# Patient Record
Sex: MALE | Race: WHITE | NOT HISPANIC OR LATINO | Employment: UNEMPLOYED | URBAN - NONMETROPOLITAN AREA
[De-identification: names, ages, dates, MRNs, and addresses within clinical notes are randomized per-mention and may not be internally consistent; named-entity substitution may affect disease eponyms.]

---
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
## 2022-03-29 ENCOUNTER — APPOINTMENT (OUTPATIENT)
Dept: ULTRASOUND IMAGING | Age: 22
End: 2022-03-29
Payer: MEDICAID

## 2022-03-29 ENCOUNTER — HOSPITAL ENCOUNTER (EMERGENCY)
Age: 22
Discharge: HOME | End: 2022-03-29
Attending: EMERGENCY MEDICINE
Payer: MEDICAID

## 2022-03-29 ENCOUNTER — APPOINTMENT (OUTPATIENT)
Dept: CT IMAGING | Age: 22
End: 2022-03-29
Payer: MEDICAID

## 2022-03-29 VITALS
RESPIRATION RATE: 20 BRPM | WEIGHT: 155 LBS | BODY MASS INDEX: 22.19 KG/M2 | DIASTOLIC BLOOD PRESSURE: 87 MMHG | SYSTOLIC BLOOD PRESSURE: 150 MMHG | OXYGEN SATURATION: 99 % | TEMPERATURE: 98.5 F | HEART RATE: 97 BPM | HEIGHT: 70 IN

## 2022-03-29 DIAGNOSIS — R10.32 LEFT INGUINAL PAIN: Primary | ICD-10-CM

## 2022-03-29 DIAGNOSIS — S30.22XA SCROTAL HEMATOMA: ICD-10-CM

## 2022-03-29 DIAGNOSIS — D73.4 SPLENIC CYST: ICD-10-CM

## 2022-03-29 LAB
A/G RATIO: 1.9 (ref 1.1–2.2)
ALBUMIN SERPL-MCNC: 4.5 G/DL (ref 3.4–5)
ALP BLD-CCNC: 118 U/L (ref 40–129)
ALT SERPL-CCNC: 49 U/L (ref 10–40)
ANION GAP SERPL CALCULATED.3IONS-SCNC: 9 MMOL/L (ref 3–16)
AST SERPL-CCNC: 38 U/L (ref 15–37)
BASOPHILS ABSOLUTE: 0 K/UL (ref 0–0.2)
BASOPHILS RELATIVE PERCENT: 0.6 %
BILIRUB SERPL-MCNC: 0.4 MG/DL (ref 0–1)
BUN BLDV-MCNC: 15 MG/DL (ref 7–20)
CALCIUM SERPL-MCNC: 8.9 MG/DL (ref 8.3–10.6)
CHLORIDE BLD-SCNC: 101 MMOL/L (ref 99–110)
CO2: 26 MMOL/L (ref 21–32)
CREAT SERPL-MCNC: 1 MG/DL (ref 0.9–1.3)
EOSINOPHILS ABSOLUTE: 0.3 K/UL (ref 0–0.6)
EOSINOPHILS RELATIVE PERCENT: 3.3 %
GFR AFRICAN AMERICAN: >60
GFR NON-AFRICAN AMERICAN: >60
GLUCOSE BLD-MCNC: 92 MG/DL (ref 70–99)
HCT VFR BLD CALC: 41.9 % (ref 40.5–52.5)
HEMOGLOBIN: 14.4 G/DL (ref 13.5–17.5)
LIPASE: 29 U/L (ref 13–60)
LYMPHOCYTES ABSOLUTE: 2.5 K/UL (ref 1–5.1)
LYMPHOCYTES RELATIVE PERCENT: 28 %
MCH RBC QN AUTO: 29.9 PG (ref 26–34)
MCHC RBC AUTO-ENTMCNC: 34.5 G/DL (ref 31–36)
MCV RBC AUTO: 86.6 FL (ref 80–100)
MONOCYTES ABSOLUTE: 1 K/UL (ref 0–1.3)
MONOCYTES RELATIVE PERCENT: 10.8 %
NEUTROPHILS ABSOLUTE: 5.1 K/UL (ref 1.7–7.7)
NEUTROPHILS RELATIVE PERCENT: 57.3 %
PDW BLD-RTO: 13.4 % (ref 12.4–15.4)
PLATELET # BLD: 202 K/UL (ref 135–450)
PMV BLD AUTO: 8.5 FL (ref 5–10.5)
POTASSIUM REFLEX MAGNESIUM: 4 MMOL/L (ref 3.5–5.1)
RBC # BLD: 4.84 M/UL (ref 4.2–5.9)
SODIUM BLD-SCNC: 136 MMOL/L (ref 136–145)
TOTAL PROTEIN: 6.9 G/DL (ref 6.4–8.2)
WBC # BLD: 8.9 K/UL (ref 4–11)

## 2022-03-29 PROCEDURE — 76870 US EXAM SCROTUM: CPT

## 2022-03-29 PROCEDURE — 99283 EMERGENCY DEPT VISIT LOW MDM: CPT

## 2022-03-29 PROCEDURE — 6360000004 HC RX CONTRAST MEDICATION: Performed by: EMERGENCY MEDICINE

## 2022-03-29 PROCEDURE — 74177 CT ABD & PELVIS W/CONTRAST: CPT

## 2022-03-29 PROCEDURE — 85025 COMPLETE CBC W/AUTO DIFF WBC: CPT

## 2022-03-29 PROCEDURE — 80053 COMPREHEN METABOLIC PANEL: CPT

## 2022-03-29 PROCEDURE — 83690 ASSAY OF LIPASE: CPT

## 2022-03-29 RX ADMIN — IOPAMIDOL 75 ML: 755 INJECTION, SOLUTION INTRAVENOUS at 04:34

## 2022-03-29 ASSESSMENT — PAIN - FUNCTIONAL ASSESSMENT: PAIN_FUNCTIONAL_ASSESSMENT: 0-10

## 2022-03-29 ASSESSMENT — PAIN DESCRIPTION - PAIN TYPE: TYPE: ACUTE PAIN

## 2022-03-29 ASSESSMENT — PAIN SCALES - GENERAL: PAINLEVEL_OUTOF10: 8

## 2022-03-29 ASSESSMENT — PAIN DESCRIPTION - ORIENTATION: ORIENTATION: LEFT

## 2022-03-29 NOTE — ED PROVIDER NOTES
CHIEF COMPLAINT  Other (lump on left testicle; noticed 3 days ago; tripled in size after intercourse within the last hour per patient)      HISTORY OF PRESENT ILLNESS  Johanna Plaza is a 19 y.o. male with a history of polysubstance use presenting for evaluation of left-sided groin and testicular pain. Patient states that he noted a lump on the left testicle several days ago. He states that he had intercourse tonight and this lump had tripled in size. He denies any pain, dysuria with urination. He states that the pain radiates up into the left lower abdomen. No fevers, nausea or vomiting. History reviewed. No pertinent past medical history. History reviewed. No pertinent surgical history. History reviewed. No pertinent family history. Social History     Socioeconomic History    Marital status: Single     Spouse name: Not on file    Number of children: Not on file    Years of education: Not on file    Highest education level: Not on file   Occupational History    Not on file   Tobacco Use    Smoking status: Current Every Day Smoker     Packs/day: 1.00     Years: 7.00     Pack years: 7.00     Types: Cigarettes    Smokeless tobacco: Never Used   Vaping Use    Vaping Use: Never used   Substance and Sexual Activity    Alcohol use: Not Currently    Drug use: Yes     Frequency: 1.0 times per week     Types: Marijuana (Weed), Methamphetamines (Crystal Meth), IV    Sexual activity: Yes     Partners: Female   Other Topics Concern    Not on file   Social History Narrative    Not on file     Social Determinants of Health     Financial Resource Strain:     Difficulty of Paying Living Expenses: Not on file   Food Insecurity:     Worried About Running Out of Food in the Last Year: Not on file    Baljeet of Food in the Last Year: Not on file   Transportation Needs:     Lack of Transportation (Medical): Not on file    Lack of Transportation (Non-Medical):  Not on file   Physical Activity:     Days of Exercise per Week: Not on file    Minutes of Exercise per Session: Not on file   Stress:     Feeling of Stress : Not on file   Social Connections:     Frequency of Communication with Friends and Family: Not on file    Frequency of Social Gatherings with Friends and Family: Not on file    Attends Christianity Services: Not on file    Active Member of [address] [place] [address] [employer] or Organizations: Not on file    Attends Club or Organization Meetings: Not on file    Marital Status: Not on file   Intimate Partner Violence:     Fear of Current or Ex-Partner: Not on file    Emotionally Abused: Not on file    Physically Abused: Not on file    Sexually Abused: Not on file   Housing Stability:     Unable to Pay for Housing in the Last Year: Not on file    Number of [place] in the Last Year: Not on file    Unstable Housing in the Last Year: Not on file     No current facility-administered medications for this encounter. No current outpatient medications on file. No Known Allergies    REVIEW OF SYSTEMS  Positive and pertinent negatives as per HPI. All other systems were reviewed and are negative. PHYSICAL EXAM  BP (!) 150/87   Pulse 97   Temp 98.5 °F (36.9 °C) (Oral)   Resp 20   Ht 5' 10\" (1.778 m)   Wt 155 lb (70.3 kg)   SpO2 99%   BMI 22.24 kg/m²   GENERAL APPEARANCE: Awake and alert. Cooperative. HEAD: Normocephalic. Atraumatic. HEART: RRR. No harsh murmurs. Intact radial pulses 2+ bilaterally. LUNGS: Respirations unlabored without accessory muscle use. Speaking comfortably in full sentences. ABDOMEN: Soft. Non-distended. Mild tenderness to left lower quadrant. No guarding or rebound.   Genitourinary exam reveals circumcised penis, no rash, bilateral descended testicles, no significant testicular swelling, mild tenderness to palpation of the left testicle, intact cremasteric reflex bilaterally, there is some mild swelling, pain to palpation at the left lower inguinal region superior to the left testicle, no overlying skin changes  EXTREMITIES: No peripheral edema. No acute deformities. SKIN: Warm and dry. No acute rashes. NEUROLOGICAL: Alert and oriented X 3. No focal deficits    LABS  I have reviewed all labs for this visit. Results for orders placed or performed during the hospital encounter of 03/29/22   CBC with Auto Differential   Result Value Ref Range    WBC 8.9 4.0 - 11.0 K/uL    RBC 4.84 4.20 - 5.90 M/uL    Hemoglobin 14.4 13.5 - 17.5 g/dL    Hematocrit 41.9 40.5 - 52.5 %    MCV 86.6 80.0 - 100.0 fL    MCH 29.9 26.0 - 34.0 pg    MCHC 34.5 31.0 - 36.0 g/dL    RDW 13.4 12.4 - 15.4 %    Platelets 737 159 - 675 K/uL    MPV 8.5 5.0 - 10.5 fL    Neutrophils % 57.3 %    Lymphocytes % 28.0 %    Monocytes % 10.8 %    Eosinophils % 3.3 %    Basophils % 0.6 %    Neutrophils Absolute 5.1 1.7 - 7.7 K/uL    Lymphocytes Absolute 2.5 1.0 - 5.1 K/uL    Monocytes Absolute 1.0 0.0 - 1.3 K/uL    Eosinophils Absolute 0.3 0.0 - 0.6 K/uL    Basophils Absolute 0.0 0.0 - 0.2 K/uL   Comprehensive Metabolic Panel w/ Reflex to MG   Result Value Ref Range    Sodium 136 136 - 145 mmol/L    Potassium reflex Magnesium 4.0 3.5 - 5.1 mmol/L    Chloride 101 99 - 110 mmol/L    CO2 26 21 - 32 mmol/L    Anion Gap 9 3 - 16    Glucose 92 70 - 99 mg/dL    BUN 15 7 - 20 mg/dL    CREATININE 1.0 0.9 - 1.3 mg/dL    GFR Non-African American >60 >60    GFR African American >60 >60    Calcium 8.9 8.3 - 10.6 mg/dL    Total Protein 6.9 6.4 - 8.2 g/dL    Albumin 4.5 3.4 - 5.0 g/dL    Albumin/Globulin Ratio 1.9 1.1 - 2.2    Total Bilirubin 0.4 0.0 - 1.0 mg/dL    Alkaline Phosphatase 118 40 - 129 U/L    ALT 49 (H) 10 - 40 U/L    AST 38 (H) 15 - 37 U/L   Lipase   Result Value Ref Range    Lipase 29.0 13.0 - 60.0 U/L     RADIOLOGY  X-RAYS:  I have reviewed radiologic plain film image(s). ALL OTHER NON-PLAIN FILM IMAGES SUCH AS CT, ULTRASOUND AND MRI HAVE BEEN READ BY THE RADIOLOGIST.   CT ABDOMEN PELVIS W IV CONTRAST Additional Contrast? None cyst.  With these findings will require repeat ultrasound and CAT scan in 6 to 12 months for follow-up. Patient was advised of these findings. He was advised to follow-up with PCP or return to emerge department for worsening symptoms. The patient will be discharged from the emergency department. The patient was counseled on their diagnosis and any medications prescribed. They were advised on the need for PCP followup. They were counseled on the need to return to the emergency department if any of their symptoms were to worsen, change or have any other concerns. Discharged in stable condition. CLINICAL IMPRESSION  1. Left inguinal pain    2. Splenic cyst    3. Scrotal hematoma        Blood pressure (!) 150/87, pulse 97, temperature 98.5 °F (36.9 °C), temperature source Oral, resp. rate 20, height 5' 10\" (1.778 m), weight 155 lb (70.3 kg), SpO2 99 %. DISPOSITION  Ave Pulse was discharged to home in stable condition. This chart was generated in part by using Dragon Dictation system and may contain errors related to that system including errors in grammar, punctuation, and spelling, as well as words and phrases that may be inappropriate. If there are any questions or concerns please feel free to contact the dictating provider for clarification.      Pat Chen MD  03/29/22 8307

## 2022-08-26 ENCOUNTER — APPOINTMENT (OUTPATIENT)
Dept: GENERAL RADIOLOGY | Age: 22
End: 2022-08-26
Payer: MEDICAID

## 2022-08-26 ENCOUNTER — HOSPITAL ENCOUNTER (EMERGENCY)
Age: 22
Discharge: HOME | End: 2022-08-27
Attending: EMERGENCY MEDICINE
Payer: MEDICAID

## 2022-08-26 ENCOUNTER — APPOINTMENT (OUTPATIENT)
Dept: CT IMAGING | Age: 22
End: 2022-08-26
Payer: MEDICAID

## 2022-08-26 DIAGNOSIS — F10.920 ACUTE ALCOHOLIC INTOXICATION WITHOUT COMPLICATION (HCC): Primary | ICD-10-CM

## 2022-08-26 PROCEDURE — 96374 THER/PROPH/DIAG INJ IV PUSH: CPT

## 2022-08-26 PROCEDURE — 70450 CT HEAD/BRAIN W/O DYE: CPT

## 2022-08-26 PROCEDURE — 96361 HYDRATE IV INFUSION ADD-ON: CPT

## 2022-08-26 PROCEDURE — 99284 EMERGENCY DEPT VISIT MOD MDM: CPT

## 2022-08-26 RX ORDER — ONDANSETRON 2 MG/ML
4 INJECTION INTRAMUSCULAR; INTRAVENOUS ONCE
Status: COMPLETED | OUTPATIENT
Start: 2022-08-26 | End: 2022-08-27

## 2022-08-26 ASSESSMENT — PAIN - FUNCTIONAL ASSESSMENT: PAIN_FUNCTIONAL_ASSESSMENT: NONE - DENIES PAIN

## 2022-08-27 ENCOUNTER — APPOINTMENT (OUTPATIENT)
Dept: GENERAL RADIOLOGY | Age: 22
End: 2022-08-27
Payer: MEDICAID

## 2022-08-27 VITALS
BODY MASS INDEX: 25.78 KG/M2 | HEART RATE: 78 BPM | DIASTOLIC BLOOD PRESSURE: 78 MMHG | OXYGEN SATURATION: 100 % | RESPIRATION RATE: 18 BRPM | TEMPERATURE: 97.7 F | SYSTOLIC BLOOD PRESSURE: 107 MMHG | WEIGHT: 179.68 LBS

## 2022-08-27 PROCEDURE — 2580000003 HC RX 258: Performed by: EMERGENCY MEDICINE

## 2022-08-27 PROCEDURE — 6360000002 HC RX W HCPCS: Performed by: EMERGENCY MEDICINE

## 2022-08-27 PROCEDURE — 71045 X-RAY EXAM CHEST 1 VIEW: CPT

## 2022-08-27 RX ORDER — 0.9 % SODIUM CHLORIDE 0.9 %
1000 INTRAVENOUS SOLUTION INTRAVENOUS ONCE
Status: COMPLETED | OUTPATIENT
Start: 2022-08-27 | End: 2022-08-27

## 2022-08-27 RX ADMIN — ONDANSETRON 4 MG: 2 INJECTION INTRAMUSCULAR; INTRAVENOUS at 00:10

## 2022-08-27 RX ADMIN — SODIUM CHLORIDE 1000 ML: 9 INJECTION, SOLUTION INTRAVENOUS at 00:32

## 2022-08-27 NOTE — DISCHARGE INSTRUCTIONS
[facility]. You need to call in morning to make appointment as directed with appropriate doctor. Should you have any questions regarding your care or further treatment, please call [facility] Emergency Department at [phone]. Take any medications as prescribed, if given any, otherwise for pain Use ibuprofen or Tylenol (unless prescribed medications that have Tylenol in it). You can take over the counter Ibuprofen (advil) tablets (4 tablets every 8 hours or 3 tablets every 6 hours or 2 tablets every 4 hours)    Return to ED if symptoms worsen, do not improve, fever > 101.5, excessive nausea or vomiting, and unable to follow up with your physician, or any other care or concern.

## 2022-08-27 NOTE — ED NOTES
Discharge instructions reviewed with pt and pt denied having any questions. Discharge paperwork signed and pt discharged.         Oumou Eckert RN  08/27/22 [id number]

## 2022-08-27 NOTE — ED PROVIDER NOTES
[id number] [facility]  eMERGENCY dEPARTMENTeNCOUnter      Pt Name: Shaina Ortiz  MRN: [id number]  [place] 2000  Date ofevaluation: 8/26/2022  Provider: Chepe Moon MD    CHIEF COMPLAINT       Chief Complaint   Patient presents with    Alcohol Intoxication     Per EMS found down alongside road. ETOH intox           HISTORY OF PRESENT ILLNESS   (Location/Symptom, Timing/Onset,Context/Setting, Quality, Duration, Modifying Factors, Severity)  Note limiting factors. Shaina Ortiz is a 19 y.o. male who  has no past medical history on file. presents to the emergency department with EMS after being found down and and clearly intoxicated. Patient arrives somnolent but is arousable to verbal stimulus. Does open his eyes to his name. Patient states he has been drinking alcohol tonight. Denies any other drug use tonight. EMS states that they did give him Narcan in the field because of his altered mental status. No increased responsiveness after the Narcan. Patient denies any falls or trauma. Unable to obtain any further history from patient secondary to his altered mental status from acute intoxication. The history is provided by the patient, the EMS personnel and medical records. NursingNotes were reviewed. Pt was seen during the [place] 19 pandemic. Appropriate PPE worn by ME during patient encounters. Patient was cared for during a time with constrained hospital bed capacity with nationwide stress on resources and staffing. REVIEW OF SYSTEMS    (2-9 systems for level 4, 10 or more for level 5)     Review of Systems   Unable to perform ROS: Mental status change (Acute intoxication)     Except as noted above the remainder of the review of systems was reviewed and negative. PAST MEDICAL HISTORY   History reviewed. No pertinent past medical history. SURGICALHISTORY     History reviewed. No pertinent surgical history.       CURRENT MEDICATIONS       Previous Medications No medications on file            Patient has no known allergies. FAMILY HISTORY     History reviewed. No pertinent family history. SOCIAL HISTORY       Social History     Socioeconomic History    Marital status: Single     Spouse name: None    Number of children: None    Years of education: None    Highest education level: None   Tobacco Use    Smoking status: Every Day     Packs/day: 1.00     Years: 7.00     Pack years: 7.00     Types: Cigarettes    Smokeless tobacco: Never   Vaping Use    Vaping Use: Never used   Substance and Sexual Activity    Alcohol use: Not Currently    Drug use: Yes     Frequency: 1.0 times per week     Types: Marijuana (Weed), Methamphetamines (Crystal Meth), IV    Sexual activity: Yes     Partners: Female       SCREENINGS    [place]by Coma Scale  Eye Opening: Spontaneous  Best Verbal Response: Oriented  Best Motor Response: Obeys commands  [place] Coma Scale Score: 15        PHYSICAL EXAM    (up to 7 for level 4, 8 or more for level 5)     Vitals:    08/27/22 0632   BP:    Pulse:    Resp:    Temp: 97.7 °F (36.5 °C)   SpO2:    ;    Physical Exam  Vitals and nursing note reviewed. Constitutional:       Appearance: He is normal weight. He is diaphoretic. Comments: Patient acutely intoxicated but does arouse to verbal stimulus   HENT:      Head: Normocephalic and atraumatic. Right Ear: Tympanic membrane normal.      Left Ear: Tympanic membrane normal.      Nose: Nose normal.      Mouth/Throat:      Mouth: Mucous membranes are moist.      Pharynx: Oropharynx is clear. Eyes:      General:         Right eye: No hordeolum. Left eye: No hordeolum. Extraocular Movements: Extraocular movements intact. Conjunctiva/sclera:      Right eye: Right conjunctiva is injected. No hemorrhage. Left eye: Left conjunctiva is injected. No hemorrhage. Neck:      Trachea: Trachea and phonation normal.   Cardiovascular:      Rate and Rhythm: Normal rate and regular rhythm. Pulses: Normal pulses. Heart sounds: Normal heart sounds, S1 normal and S2 normal. No murmur heard. No gallop. Pulmonary:      Effort: Pulmonary effort is normal.      Breath sounds: Normal breath sounds and air entry. No stridor. No wheezing, rhonchi or rales. Chest:      Chest wall: No deformity, tenderness or crepitus. Abdominal:      General: Abdomen is flat. Bowel sounds are normal.      Palpations: Abdomen is soft. Tenderness: There is no abdominal tenderness. There is no guarding or rebound. Musculoskeletal:         General: No deformity or signs of injury. Cervical back: Full passive range of motion without pain and neck supple. Right lower leg: No edema. Left lower leg: No edema. Skin:     General: Skin is warm. Capillary Refill: Capillary refill takes less than 2 seconds. Comments: Track marks bilateral forearms   Psychiatric:         Speech: Speech is slurred (acutely intoxicated). Behavior: Behavior is cooperative. Thought Content: Thought content does not include homicidal or suicidal ideation. RESULTS     EKG: All EKG's are interpreted by the Emergency Department Physician who either signs or Co-signsthis chart in the absence of a cardiologist.      RADIOLOGY:   [address] such as CT, Ultrasound and MRI are read by the radiologist. Plain radiographic images are visualized and preliminarily interpreted by the emergency physician with the below findings:=    Interpretation per the Radiologist below, if available at the time ofthis note:    XR CHEST PORTABLE   Final Result   No acute cardiopulmonary process. CT HEAD WO CONTRAST   Preliminary Result   1. Study limited by patient nonstandard positioning within the CT gantry. 2. No gross acute intracranial abnormality.                ED BEDSIDE ULTRASOUND:   Performed by ED Physician - none    LABS:  Labs Reviewed - No data to display    All other labs were within normal range or not returned as of this dictation. EMERGENCY DEPARTMENT COURSE and DIFFERENTIAL DIAGNOSIS/MDM:   Vitals:    Vitals:    08/26/22 2352 08/27/22 0632   BP: 107/78    Pulse: 78    Resp: 18    Temp:  97.7 °F (36.5 °C)   TempSrc:  Oral   SpO2: 100%    Weight: 179 lb 10.8 oz (81.5 kg)          MDM  Number of Diagnoses or Management Options  Acute alcoholic intoxication without complication [facility]): new and requires workup  Diagnosis management comments: Acute alcohol intoxication, polysubstance abuse    Patient seen and evaluated. History and physical as above. Nontoxic, afebrile. Patient arrives acutely intoxicated. Does admit to drinking alcohol tonight. Patient does arouse to verbal stimulus but easily falls back asleep. O2 saturation 100% on room air on arrival.  Did receive Narcan prior to arrival with EMS although still remains somnolent. No signs of trauma. Patient placed on cardiac monitor on arrival.  Will obtain CT head and chest x-ray as patient was found down. Patient denies any trauma though. Patient denies having any persons who can, and pick him up from the emergency room. Amount and/or Complexity of Data Reviewed  Tests in the radiology section of CPT®: ordered and reviewed  Review and summarize past medical records: yes  Independent visualization of images, tracings, or specimens: yes    Risk of Complications, Morbidity, and/or Mortality  Presenting problems: moderate  Diagnostic procedures: low  Management options: low          REASSESSMENT     ED Course as of 08/27/22 5297   Sat Aug 27, 2022   0153 Patient CT head Limited study secondary to patient's positioning although no evidence of acute abnormality. Chest x-ray unremarkable. Patient sleeping comfortably. No respiratory distress. O2 saturation 96% on room air. [DS]   5161 Patient now awake. Patient ambulated to the restroom with a steady gait. No signs of clinical intoxication.   Plan for discharge with outpatient follow-up. Patient agreeable. Return instruction provided. All questions answered prior to discharge. [DS]      ED Course User Index  [DS] Stevan Espinosa MD     Is this patient to be included in the SEP-1 Core Measure due to severe sepsis or septic shock? No   Exclusion criteria - the patient is NOT to be included for SEP-1 Core Measure due to: Infection is not suspected    I estimate there is LOW risk for SUICIDAL BEHAVIOR, HOMICIDAL BEHAVIOR, PSYCHOSIS, DANGEROUS OR VIOLENT BEHAVIOR, DISORIENTATION, OR MENTAL HEALTH CONDITION REQUIRING HOSPITALIZATION, thus I consider the discharge disposition reasonable. CRITICAL CARE TIME   Total Critical Care time was 12 minutes, excluding separatelyreportable procedures. There was a high probability ofclinically significant/life threatening deterioration in the patient's condition which required my urgent intervention. AMS evaluation    CONSULTS:  None    PROCEDURES:  Unless otherwise noted below, none     Procedures    I am the Primary Clinician of Record     FINAL IMPRESSION      1. Acute alcoholic intoxication without complication [facility])          DISPOSITION/PLAN   DISPOSITION Decision To Discharge 08/27/2022 06:32:33 AM      PATIENT REFERREDTO:  Homa Becerra  [phone]  Call   For a follow up appointment.       DISCHARGEMEDICATIONS:  New Prescriptions    No medications on file          (Please note that portions of this note were completed with a voice recognition program.  Efforts were made to edit the dictations but occasionally words are mis-transcribed.)    Stevan Espinosa MD (electronically signed)  Attending Emergency Physician         Stevan Espinosa MD  08/27/22 5714